# Patient Record
Sex: FEMALE | Race: ASIAN
[De-identification: names, ages, dates, MRNs, and addresses within clinical notes are randomized per-mention and may not be internally consistent; named-entity substitution may affect disease eponyms.]

---

## 2023-06-21 PROBLEM — Z00.00 ENCOUNTER FOR PREVENTIVE HEALTH EXAMINATION: Status: ACTIVE | Noted: 2023-06-21

## 2023-08-08 ENCOUNTER — APPOINTMENT (OUTPATIENT)
Dept: SURGERY | Facility: CLINIC | Age: 38
End: 2023-08-08
Payer: COMMERCIAL

## 2023-08-08 VITALS
WEIGHT: 117 LBS | SYSTOLIC BLOOD PRESSURE: 118 MMHG | DIASTOLIC BLOOD PRESSURE: 68 MMHG | HEART RATE: 77 BPM | BODY MASS INDEX: 21.53 KG/M2 | OXYGEN SATURATION: 99 % | TEMPERATURE: 97 F | HEIGHT: 62 IN

## 2023-08-08 DIAGNOSIS — Z78.9 OTHER SPECIFIED HEALTH STATUS: ICD-10-CM

## 2023-08-08 DIAGNOSIS — K64.5 PERIANAL VENOUS THROMBOSIS: ICD-10-CM

## 2023-08-08 DIAGNOSIS — Z81.8 FAMILY HISTORY OF OTHER MENTAL AND BEHAVIORAL DISORDERS: ICD-10-CM

## 2023-08-08 DIAGNOSIS — Z80.6 FAMILY HISTORY OF LEUKEMIA: ICD-10-CM

## 2023-08-08 DIAGNOSIS — Z82.49 FAMILY HISTORY OF ISCHEMIC HEART DISEASE AND OTHER DISEASES OF THE CIRCULATORY SYSTEM: ICD-10-CM

## 2023-08-08 DIAGNOSIS — K64.4 RESIDUAL HEMORRHOIDAL SKIN TAGS: ICD-10-CM

## 2023-08-08 PROCEDURE — 99203 OFFICE O/P NEW LOW 30 MIN: CPT

## 2023-08-21 PROBLEM — K64.5 THROMBOSED EXTERNAL HEMORRHOIDS: Status: ACTIVE | Noted: 2023-08-21

## 2023-08-21 PROBLEM — K64.4 RESIDUAL HEMORRHOIDAL SKIN TAGS: Status: ACTIVE | Noted: 2023-08-21

## 2023-08-21 PROBLEM — Z78.9 NON-SMOKER: Status: ACTIVE | Noted: 2023-08-08

## 2023-08-21 PROBLEM — Z82.49 FAMILY HISTORY OF HYPERTENSION: Status: ACTIVE | Noted: 2023-08-08

## 2023-08-21 PROBLEM — Z80.6 FAMILY HISTORY OF LEUKEMIA: Status: ACTIVE | Noted: 2023-08-08

## 2023-08-21 PROBLEM — Z81.8 FAMILY HISTORY OF DEMENTIA: Status: ACTIVE | Noted: 2023-08-08

## 2023-08-21 NOTE — HISTORY OF PRESENT ILLNESS
[FreeTextEntry1] : Patient is a 37-year-old female with no PMHx, PSHx of  D&amp;C who presents for evaluation of hemorrhoids. Patient reports approximately one to two months ago developing perianal pain and swelling. She was evaluated by her primary care and given topical hydrocortisone cream. She reports improvement but still has some swelling in the area. This is the first time this has happened. She reports daily bowel movements without constipation or diarrhea. Patient denies recent fevers, chills, nausea, vomiting, abdominal pain, or blood in her stool. She denies family history of colon cancer, rectal cancer, or inflammatory bowel disease. Her last colonoscopy was in 2021 which showed a hyperplastic polyp.

## 2023-08-21 NOTE — PHYSICAL EXAM
[FreeTextEntry1] : Digital rectal exam, and anoscopy shows normal sphincter tone, normal internal hemorrhoids and no masses.

## 2023-08-21 NOTE — ADDENDUM
[FreeTextEntry1] : I, Sophia Abbasi ([facility]) assisted in filling out this chart under the dictation of Dr. Osman Burks on 08/11/2023.

## 2023-08-21 NOTE — ASSESSMENT
[FreeTextEntry1] : 37-year-old female with resolving thrombolytic external hemorrhoid and residual hemorrhoid skin tags.  I discussed the pathology with the patient. I recommend that a high-fiber diet, fiber supplementation, and increase water intake. Should she have recurrent symptoms, she will contact our office. We'll see her back as needed.

## 2023-08-21 NOTE — REASON FOR VISIT
[Initial Evaluation] : an initial evaluation [FreeTextEntry1] : Residual Hemorrhoid Skin Tag and Thrombosed External Hemorrhoid

## 2023-08-21 NOTE — PROCEDURE
[FreeTextEntry1] : General: Awake, Alert, No Acute Distress Respiratory: Normal Respiratory Effort  Rectal: External examination shows large residual hemorrhoid skin tags and a resolving thrombolytic external hemorrhoid.

## 2023-09-05 ENCOUNTER — NON-APPOINTMENT (OUTPATIENT)
Age: 38
End: 2023-09-05